# Patient Record
Sex: FEMALE | Employment: UNEMPLOYED | URBAN - METROPOLITAN AREA
[De-identification: names, ages, dates, MRNs, and addresses within clinical notes are randomized per-mention and may not be internally consistent; named-entity substitution may affect disease eponyms.]

---

## 2022-09-06 ENCOUNTER — MED REC SCAN ONLY (OUTPATIENT)
Dept: FAMILY MEDICINE CLINIC | Facility: CLINIC | Age: 67
End: 2022-09-06

## 2024-05-02 NOTE — PAT NURSING NOTE
PreOp Instructions     You are scheduled for: a Cardiac Procedure     Date of Procedure: 05/07/24. Tentative arrival 8:30 am     Diet Instructions: Do not eat or drink anything after midnight     Medications: Medications you are allowed to take can be taken with a sip of water the morning of your procedure     Medications to Stop: Hold herbal supplements and vitamins morning of procedure     Skin Prep: Shower with antibacterial soap using a clean washcloth, prior to procedure     Driving After Procedure: If sedation is given, you WILL NOT be able to drive home. You will need a responsible adult  to drive you home.     Discharge Teaching: Your nurse will give you specific instructions before discharge, Most people can resume normal activities in 2-3 days, Any questions, please call the physician's office

## 2024-05-07 ENCOUNTER — HOSPITAL ENCOUNTER (OUTPATIENT)
Dept: INTERVENTIONAL RADIOLOGY/VASCULAR | Facility: HOSPITAL | Age: 69
Discharge: HOME | End: 2024-05-07
Attending: INTERNAL MEDICINE | Admitting: INTERNAL MEDICINE
Payer: COMMERCIAL

## 2024-05-07 VITALS
TEMPERATURE: 98 F | BODY MASS INDEX: 27.6 KG/M2 | SYSTOLIC BLOOD PRESSURE: 143 MMHG | OXYGEN SATURATION: 94 % | WEIGHT: 150 LBS | RESPIRATION RATE: 18 BRPM | HEART RATE: 45 BPM | DIASTOLIC BLOOD PRESSURE: 70 MMHG | HEIGHT: 62 IN

## 2024-05-07 DIAGNOSIS — I47.10 SVT (SUPRAVENTRICULAR TACHYCARDIA) (HCC): ICD-10-CM

## 2024-05-07 PROCEDURE — 02583ZZ DESTRUCTION OF CONDUCTION MECHANISM, PERCUTANEOUS APPROACH: ICD-10-PCS | Performed by: INTERNAL MEDICINE

## 2024-05-07 PROCEDURE — 4A023FZ MEASUREMENT OF CARDIAC RHYTHM, PERCUTANEOUS APPROACH: ICD-10-PCS | Performed by: INTERNAL MEDICINE

## 2024-05-07 PROCEDURE — 99152 MOD SED SAME PHYS/QHP 5/>YRS: CPT | Performed by: INTERNAL MEDICINE

## 2024-05-07 PROCEDURE — 4A0234Z MEASUREMENT OF CARDIAC ELECTRICAL ACTIVITY, PERCUTANEOUS APPROACH: ICD-10-PCS | Performed by: INTERNAL MEDICINE

## 2024-05-07 PROCEDURE — 93623 PRGRMD STIMJ&PACG IV RX NFS: CPT | Performed by: INTERNAL MEDICINE

## 2024-05-07 PROCEDURE — 93653 COMPRE EP EVAL TX SVT: CPT | Performed by: INTERNAL MEDICINE

## 2024-05-07 PROCEDURE — 02K83ZZ MAP CONDUCTION MECHANISM, PERCUTANEOUS APPROACH: ICD-10-PCS | Performed by: INTERNAL MEDICINE

## 2024-05-07 PROCEDURE — 99153 MOD SED SAME PHYS/QHP EA: CPT | Performed by: INTERNAL MEDICINE

## 2024-05-07 RX ORDER — LIDOCAINE HYDROCHLORIDE 10 MG/ML
INJECTION, SOLUTION EPIDURAL; INFILTRATION; INTRACAUDAL; PERINEURAL
Status: COMPLETED
Start: 2024-05-07 | End: 2024-05-07

## 2024-05-07 RX ORDER — HEPARIN SODIUM 1000 [USP'U]/ML
INJECTION, SOLUTION INTRAVENOUS; SUBCUTANEOUS
Status: COMPLETED
Start: 2024-05-07 | End: 2024-05-07

## 2024-05-07 RX ORDER — SODIUM CHLORIDE 9 MG/ML
INJECTION, SOLUTION INTRAVENOUS
Status: DISCONTINUED | OUTPATIENT
Start: 2024-05-08 | End: 2024-05-07 | Stop reason: HOSPADM

## 2024-05-07 RX ORDER — HEPARIN SODIUM 5000 [USP'U]/ML
INJECTION, SOLUTION INTRAVENOUS; SUBCUTANEOUS
Status: COMPLETED
Start: 2024-05-07 | End: 2024-05-07

## 2024-05-07 RX ORDER — ISOPROTERENOL HYDROCHLORIDE 0.2 MG/ML
INJECTION, SOLUTION INTRAVENOUS
Status: COMPLETED
Start: 2024-05-07 | End: 2024-05-07

## 2024-05-07 RX ORDER — MIDAZOLAM HYDROCHLORIDE 1 MG/ML
INJECTION INTRAMUSCULAR; INTRAVENOUS
Status: COMPLETED
Start: 2024-05-07 | End: 2024-05-07

## 2024-05-07 RX ORDER — CHLORHEXIDINE GLUCONATE 40 MG/ML
30 SOLUTION TOPICAL
Status: DISCONTINUED | OUTPATIENT
Start: 2024-05-08 | End: 2024-05-07 | Stop reason: HOSPADM

## 2024-05-07 RX ORDER — SODIUM CHLORIDE 9 MG/ML
INJECTION, SOLUTION INTRAVENOUS CONTINUOUS
Status: DISCONTINUED | OUTPATIENT
Start: 2024-05-07 | End: 2024-05-07

## 2024-05-07 NOTE — PROCEDURES
Electrophysiology Study with Radiofrequency Ablation      History:  Char Mann is a 69 year old female with recurrent supraventricular tachycardia who presents for an ablation. The risks (including, but not limited to, hematoma, vascular damage, pneumothorax, tamponade, need for a pacemaker, phrenic nerve injury, myocardial infarction, stroke, and death), benefits (no supraventricular tachycardia), and alternatives (medications, anti-arrhythmic drugs, observation) of the procedure were discussed. The patient understands and agrees to proceed.      Procedure:  The patient was brought to the electrophysiology study in the fasting and nonsedated state. The left and right groins were prepped and draped in the usual sterile fashion. 50mcg of Fentanyl and 2mg of Versed were administered by certified nursing staff and supervised directly by me from 10:25 to 11:35. 1% lidocaine was used for skin anesthesia. An 8 Fr and two 7 Fr sheaths were placed in the right femoral vein using ultrasound guidance. Catheters were placed in the appropriate positions under CARTO guidance    Procedures performed:  Comprehensive EP study  Pacing and recording of the LA from the CS  Attempted induction of arrhythmia with and without isoproterenol  3D mapping of tachycardia with CARTO  Radiofrequency ablation    After programmed stimulation and ablation, the catheters and sheaths were removed and hemostasis was achieved with Vascade.    Results:  Baseline intervals: SCL: 1200, FL: 160, QRS: 85, QT: 385, AH: 64, HV: 45  AVBCL: 550  AVNERP: 320 at 800    SVT was easily induced and was seen spontaenously affter PACs  SVT intervals: CL: 520, QRS: 85, HV: 40, VA to prox CS: 0, VA to dist CS: 20  The SVT was entrained from the RV and produced a VAV response  This is all diagnostic of typical AVNRT    Mapping and Ablation:  The SVT was mapped to the posteroseptal tricuspid annulus. Mapping was performed during SR. CARTO was used for localization  and mapping. Ablation sites were based on electrogram criteria, with several sites demonstrating slow pathway potentials. Ablation was performed with an irrigated force sensing ablation catheter at 25-30W through a steerable sheath. During lesions, junctional beats were noted.    Post ablation findings:  Baseline intervals: SCL: 1200, AK: 160, QRS: 85, QT: 400, AH: 70, HV: 42  AVBCL: 650  AVNERP: 560 at 800  VABCL: 340    No arrhythmias were induced    Post ablation findings on isoproterenol 2mcgs/min:  Baseline intervals: SCL: 800, AK: 150, QRS: 85  AVBCL: 350  AVNERP: <280 at 600    No arrhythmias were induced. 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

## 2024-05-07 NOTE — PROGRESS NOTES
Pt s/p ablation via right femoral vein with Dr. Miller. Right groin soft with no evidence of bleeding, right pedal pulse palpable. VSS. Pt awake and tolerating PO. Pt voided and ambulated in hallway, groin remains soft and dressing c/d/I. Discharge instructions reviewed with patient and her daughter, copy given and all questions answered. IV removed. Pt discharged via wheelchair to [facility], daughter to drive patient home.

## 2024-05-07 NOTE — H&P
69 year old female    Had palpitations and dyspnea on exertion and was told to get a stress test. Found to have sustained supraventricular tachycardia when she came in for stress test    Ziopatch showed more supraventricular tachycardia. See below    Notes palpitations daily lasting seconds \"racing heart beats\"    Lives in [place] half the year    Started having an episode in office today. Not able to termianted with Valsalva. Not bothering her too much    Denies chest pain, lightheadedness, syncope, orthopnea, paroxysmal nocturnal dyspnea, or edema.    ROS: All other systems were reviewed and were negative.    Past Medical History:  Hypertension   Sinus bradycardia  supraventricular tachycardia     Medications:  Current Outpatient Medications:   predniSONE & diphenhydrAMINE (CONTRAST ALLERGY PREMED PACK) 3 x 50 MG & 1 x 50 MG Oral Kit, Take 50 mg by mouth as needed. Take 50 mg 13 hours, 7 hours, and 1 hour prior to procedure, Disp: 1 kit, Rfl: 0  atorvastatin 10 MG Oral Tab, Take 1 tablet (10 mg total) by mouth daily., Disp: 90 tablet, Rfl: 3  losartan 50 MG Oral Tab, Take 1 tablet (50 mg total) by mouth daily., Disp: 90 tablet, Rfl: 3  Desonide 0.05 % External Ointment, Use twice daily on affected areas for 2 weeks- ok for face/neck/groins, Disp: 60 g, Rfl: 1  Lifitegrast 5 % Ophthalmic Solution, Apply 1 drop to eye 2 (two) times daily., Disp: , Rfl:   CALCIUM CARBONATE-VITAMIN D OR, Take 2,000 mg by mouth daily., Disp: , Rfl:   aspirin 81 MG Oral Tab, Take 81 mg by mouth daily., Disp: , Rfl:   omega-3 fatty acids 1000 MG Oral Cap, Take 1,000 mg by mouth daily., Disp: , Rfl:   Flaxseed, Linseed, (FLAXSEED OIL) 1000 MG Oral Cap, Take 1,000 mg by mouth 2 (two) times daily., Disp: , Rfl:     Social: tobacco: denies, Alcohol: denies     FMH: No history of sudden cardiac death    Physical:  /82  Pulse (!) 46  Ht 5' (1.524 m)  Wt 151 lb (68.5 kg)  BMI 29.49 kg/m²   GENERAL: well developed, well nourished,  in no apparent distress  EYES: sclera anicteric  HEENT: normocephalic  NECK: no JVD, no carotid bruits  RESPIRATORY: clear to auscultation  CARDIOVASCULAR: S1, S2 normal, RRR; no S3, no S4; no click; no murmur  ABDOMEN: normal active BS, nondistended  EXTREMITIES: no cyanosis, clubbing or edema, peripheral pulses intact  NEURO: no sensorimotor deficits  PSYCHIATRIC: alert and oriented x 3, affect normal  SKIN: no rashes    Data:  EC2024: SR 46  EC2024: SR 43  Nuc stress: 3/2024: EF normal, cannot exclude ischemia  Ziopatch: 2024: SR avg 49. 13 episodes of supraventricular tachycardia: longest 90 minutes at 160bpm. May start with long MI interval?  Echo: 2024: EF 55-60, mild LAE  Tsh: 3/2024: normal    Impression:  supraventricular tachycardia   Sinus bradycardia    Plan:  Discussed options including observation and Valsalva, daily medications, and catheter ablation.  Medications not possible due to bradycardia. Can take metoprolol prn  She would like to proceed with an ablation. The risks (including, but not limited to, hematoma, vascular damage, pneumothorax, tamponade, need for a pacemaker, phrenic nerve injury, myocardial infarction, stroke, and death), benefits (no supraventricular tachycardia), and alternatives (medications, observation) of the procedure were discussed. The patient understands and agrees to proceed.